# Patient Record
Sex: MALE | Race: WHITE | NOT HISPANIC OR LATINO | URBAN - METROPOLITAN AREA
[De-identification: names, ages, dates, MRNs, and addresses within clinical notes are randomized per-mention and may not be internally consistent; named-entity substitution may affect disease eponyms.]

---

## 2023-08-28 PROBLEM — R10.13 DYSPEPSIA: Status: ACTIVE | Noted: 2023-08-28

## 2023-08-28 PROBLEM — F90.9 ATTENTION DEFICIT HYPERACTIVITY DISORDER (ADHD): Status: ACTIVE | Noted: 2023-08-28

## 2023-08-28 PROBLEM — G47.33 OBSTRUCTIVE SLEEP APNEA: Status: ACTIVE | Noted: 2023-08-28

## 2023-08-28 PROBLEM — I86.1 VARICOCELE: Status: ACTIVE | Noted: 2023-08-28

## 2023-08-28 PROBLEM — I10 ELEVATED BLOOD PRESSURE READING IN OFFICE WITH DIAGNOSIS OF HYPERTENSION: Status: ACTIVE | Noted: 2023-08-28

## 2023-08-28 PROBLEM — E88.810 DYSMETABOLIC SYNDROME: Status: ACTIVE | Noted: 2023-08-28

## 2023-08-28 PROBLEM — E78.5 HYPERLIPIDEMIA: Status: ACTIVE | Noted: 2023-08-28

## 2023-08-28 RX ORDER — OXYCODONE AND ACETAMINOPHEN 5; 325 MG/1; MG/1
1 TABLET ORAL EVERY 4 HOURS PRN
COMMUNITY

## 2023-08-28 RX ORDER — GLUCOSAM/CHONDRO/HERB 149/HYAL 750-100 MG
1 TABLET ORAL DAILY
COMMUNITY

## 2023-08-28 RX ORDER — BACLOFEN 10 MG/1
1 TABLET ORAL 3 TIMES DAILY PRN
COMMUNITY

## 2023-08-28 RX ORDER — NAPROXEN SODIUM 220 MG/1
1 TABLET ORAL DAILY
COMMUNITY

## 2023-08-28 RX ORDER — BUPROPION HYDROCHLORIDE 300 MG/1
1 TABLET ORAL EVERY MORNING
COMMUNITY

## 2023-08-28 RX ORDER — DOXYCYCLINE HYCLATE 100 MG
1 TABLET ORAL 2 TIMES DAILY
COMMUNITY

## 2023-08-28 RX ORDER — DEXTROAMPHETAMINE SACCHARATE, AMPHETAMINE ASPARTATE MONOHYDRATE, DEXTROAMPHETAMINE SULFATE AND AMPHETAMINE SULFATE 7.5; 7.5; 7.5; 7.5 MG/1; MG/1; MG/1; MG/1
1 CAPSULE, EXTENDED RELEASE ORAL EVERY MORNING
COMMUNITY
Start: 2019-04-16

## 2023-10-04 ENCOUNTER — TELEPHONE (OUTPATIENT)
Dept: PRIMARY CARE | Facility: CLINIC | Age: 48
End: 2023-10-04
Payer: COMMERCIAL

## 2023-10-04 DIAGNOSIS — Z12.5 SCREENING FOR PROSTATE CANCER: ICD-10-CM

## 2023-10-04 DIAGNOSIS — Z13.6 ENCOUNTER FOR SCREENING FOR CARDIOVASCULAR DISORDERS: Primary | ICD-10-CM

## 2023-10-04 DIAGNOSIS — F41.9 ANXIETY: ICD-10-CM

## 2023-10-04 DIAGNOSIS — R53.82 CHRONIC FATIGUE: ICD-10-CM

## 2023-10-04 DIAGNOSIS — E78.2 MIXED HYPERLIPIDEMIA: ICD-10-CM

## 2023-10-04 DIAGNOSIS — E03.9 HYPOTHYROIDISM, UNSPECIFIED TYPE: ICD-10-CM

## 2023-10-04 DIAGNOSIS — E88.810 DYSMETABOLIC SYNDROME: ICD-10-CM

## 2023-10-04 NOTE — TELEPHONE ENCOUNTER
Patient called office to request lab orders for routine blood work and order for CT calcium scoring.

## 2023-11-03 ENCOUNTER — LAB (OUTPATIENT)
Dept: LAB | Facility: LAB | Age: 48
End: 2023-11-03
Payer: COMMERCIAL

## 2023-11-03 DIAGNOSIS — E78.2 MIXED HYPERLIPIDEMIA: ICD-10-CM

## 2023-11-03 DIAGNOSIS — Z12.5 SCREENING FOR PROSTATE CANCER: ICD-10-CM

## 2023-11-03 DIAGNOSIS — E03.9 HYPOTHYROIDISM, UNSPECIFIED TYPE: ICD-10-CM

## 2023-11-03 DIAGNOSIS — R53.82 CHRONIC FATIGUE: ICD-10-CM

## 2023-11-03 DIAGNOSIS — E88.810 DYSMETABOLIC SYNDROME: ICD-10-CM

## 2023-11-03 LAB
25(OH)D3 SERPL-MCNC: 40 NG/ML (ref 30–100)
ALBUMIN SERPL BCP-MCNC: 4.1 G/DL (ref 3.4–5)
ALP SERPL-CCNC: 35 U/L (ref 33–120)
ALT SERPL W P-5'-P-CCNC: 32 U/L (ref 10–52)
ANION GAP SERPL CALC-SCNC: 12 MMOL/L (ref 10–20)
AST SERPL W P-5'-P-CCNC: 30 U/L (ref 9–39)
BASOPHILS # BLD AUTO: 0.05 X10*3/UL (ref 0–0.1)
BASOPHILS NFR BLD AUTO: 1.1 %
BILIRUB SERPL-MCNC: 0.5 MG/DL (ref 0–1.2)
BUN SERPL-MCNC: 10 MG/DL (ref 6–23)
CALCIUM SERPL-MCNC: 9.5 MG/DL (ref 8.6–10.6)
CHLORIDE SERPL-SCNC: 106 MMOL/L (ref 98–107)
CHOLEST SERPL-MCNC: 184 MG/DL (ref 0–199)
CHOLESTEROL/HDL RATIO: 4.1
CO2 SERPL-SCNC: 29 MMOL/L (ref 21–32)
CREAT SERPL-MCNC: 1.19 MG/DL (ref 0.5–1.3)
EOSINOPHIL # BLD AUTO: 0.27 X10*3/UL (ref 0–0.7)
EOSINOPHIL NFR BLD AUTO: 5.8 %
ERYTHROCYTE [DISTWIDTH] IN BLOOD BY AUTOMATED COUNT: 14.8 % (ref 11.5–14.5)
EST. AVERAGE GLUCOSE BLD GHB EST-MCNC: 108 MG/DL
FERRITIN SERPL-MCNC: 81 NG/ML (ref 20–300)
FOLATE SERPL-MCNC: 10.9 NG/ML
GFR SERPL CREATININE-BSD FRML MDRD: 75 ML/MIN/1.73M*2
GLUCOSE SERPL-MCNC: 89 MG/DL (ref 74–99)
HBA1C MFR BLD: 5.4 %
HCT VFR BLD AUTO: 47.2 % (ref 41–52)
HDLC SERPL-MCNC: 45.4 MG/DL
HGB BLD-MCNC: 15.4 G/DL (ref 13.5–17.5)
IMM GRANULOCYTES # BLD AUTO: 0.02 X10*3/UL (ref 0–0.7)
IMM GRANULOCYTES NFR BLD AUTO: 0.4 % (ref 0–0.9)
IRON SATN MFR SERPL: 19 % (ref 25–45)
IRON SERPL-MCNC: 65 UG/DL (ref 35–150)
LDLC SERPL CALC-MCNC: 130 MG/DL
LYMPHOCYTES # BLD AUTO: 1.4 X10*3/UL (ref 1.2–4.8)
LYMPHOCYTES NFR BLD AUTO: 30.2 %
MCH RBC QN AUTO: 28.6 PG (ref 26–34)
MCHC RBC AUTO-ENTMCNC: 32.6 G/DL (ref 32–36)
MCV RBC AUTO: 88 FL (ref 80–100)
MONOCYTES # BLD AUTO: 0.37 X10*3/UL (ref 0.1–1)
MONOCYTES NFR BLD AUTO: 8 %
NEUTROPHILS # BLD AUTO: 2.52 X10*3/UL (ref 1.2–7.7)
NEUTROPHILS NFR BLD AUTO: 54.5 %
NON HDL CHOLESTEROL: 139 MG/DL (ref 0–149)
NRBC BLD-RTO: 0 /100 WBCS (ref 0–0)
PLATELET # BLD AUTO: 307 X10*3/UL (ref 150–450)
POTASSIUM SERPL-SCNC: 5.2 MMOL/L (ref 3.5–5.3)
PROT SERPL-MCNC: 6.3 G/DL (ref 6.4–8.2)
PSA SERPL-MCNC: 0.86 NG/ML
RBC # BLD AUTO: 5.39 X10*6/UL (ref 4.5–5.9)
SODIUM SERPL-SCNC: 142 MMOL/L (ref 136–145)
TIBC SERPL-MCNC: 345 UG/DL (ref 240–445)
TRIGL SERPL-MCNC: 43 MG/DL (ref 0–149)
TSH SERPL-ACNC: 2.32 MIU/L (ref 0.44–3.98)
UIBC SERPL-MCNC: 280 UG/DL (ref 110–370)
VIT B12 SERPL-MCNC: 398 PG/ML (ref 211–911)
VLDL: 9 MG/DL (ref 0–40)
WBC # BLD AUTO: 4.6 X10*3/UL (ref 4.4–11.3)

## 2023-11-03 PROCEDURE — 83036 HEMOGLOBIN GLYCOSYLATED A1C: CPT

## 2023-11-03 PROCEDURE — 82306 VITAMIN D 25 HYDROXY: CPT

## 2023-11-03 PROCEDURE — 84153 ASSAY OF PSA TOTAL: CPT

## 2023-11-03 PROCEDURE — 80061 LIPID PANEL: CPT

## 2023-11-03 PROCEDURE — 36415 COLL VENOUS BLD VENIPUNCTURE: CPT

## 2023-11-03 PROCEDURE — 82728 ASSAY OF FERRITIN: CPT

## 2023-11-03 PROCEDURE — 83540 ASSAY OF IRON: CPT

## 2023-11-03 PROCEDURE — 83550 IRON BINDING TEST: CPT

## 2023-11-03 PROCEDURE — 82607 VITAMIN B-12: CPT

## 2023-11-03 PROCEDURE — 84443 ASSAY THYROID STIM HORMONE: CPT

## 2023-11-03 PROCEDURE — 85025 COMPLETE CBC W/AUTO DIFF WBC: CPT

## 2023-11-03 PROCEDURE — 82746 ASSAY OF FOLIC ACID SERUM: CPT

## 2023-11-03 PROCEDURE — 80053 COMPREHEN METABOLIC PANEL: CPT

## 2024-07-30 ENCOUNTER — LAB (OUTPATIENT)
Dept: LAB | Facility: LAB | Age: 49
End: 2024-07-30
Payer: COMMERCIAL

## 2024-07-30 DIAGNOSIS — F33.2 MAJOR DEPRESSIVE DISORDER, RECURRENT SEVERE WITHOUT PSYCHOTIC FEATURES (MULTI): ICD-10-CM

## 2024-07-30 DIAGNOSIS — R53.82 CHRONIC FATIGUE, UNSPECIFIED: Primary | ICD-10-CM

## 2024-07-30 DIAGNOSIS — R37 SEXUAL DYSFUNCTION, UNSPECIFIED: ICD-10-CM

## 2024-07-30 LAB
25(OH)D3 SERPL-MCNC: 40 NG/ML (ref 30–100)
ALBUMIN SERPL BCP-MCNC: 4.4 G/DL (ref 3.4–5)
ALP SERPL-CCNC: 52 U/L (ref 33–120)
ALT SERPL W P-5'-P-CCNC: 24 U/L (ref 10–52)
ANION GAP SERPL CALC-SCNC: 12 MMOL/L (ref 10–20)
AST SERPL W P-5'-P-CCNC: 22 U/L (ref 9–39)
BASOPHILS # BLD AUTO: 0.06 X10*3/UL (ref 0–0.1)
BASOPHILS NFR BLD AUTO: 1.1 %
BILIRUB SERPL-MCNC: 1.1 MG/DL (ref 0–1.2)
BUN SERPL-MCNC: 12 MG/DL (ref 6–23)
CALCIUM SERPL-MCNC: 9.2 MG/DL (ref 8.6–10.6)
CHLORIDE SERPL-SCNC: 101 MMOL/L (ref 98–107)
CHOLEST SERPL-MCNC: 215 MG/DL (ref 0–199)
CHOLESTEROL/HDL RATIO: 3.4
CO2 SERPL-SCNC: 30 MMOL/L (ref 21–32)
CREAT SERPL-MCNC: 1.25 MG/DL (ref 0.5–1.3)
EGFRCR SERPLBLD CKD-EPI 2021: 70 ML/MIN/1.73M*2
EOSINOPHIL # BLD AUTO: 0.41 X10*3/UL (ref 0–0.7)
EOSINOPHIL NFR BLD AUTO: 7.6 %
ERYTHROCYTE [DISTWIDTH] IN BLOOD BY AUTOMATED COUNT: 14.3 % (ref 11.5–14.5)
EST. AVERAGE GLUCOSE BLD GHB EST-MCNC: 105 MG/DL
ESTRADIOL SERPL-MCNC: <19 PG/ML
FERRITIN SERPL-MCNC: 44 NG/ML (ref 20–300)
FOLATE SERPL-MCNC: 4.5 NG/ML
GLUCOSE SERPL-MCNC: 83 MG/DL (ref 74–99)
HBA1C MFR BLD: 5.3 %
HCT VFR BLD AUTO: 46.3 % (ref 41–52)
HDLC SERPL-MCNC: 63.8 MG/DL
HGB BLD-MCNC: 15.2 G/DL (ref 13.5–17.5)
IMM GRANULOCYTES # BLD AUTO: 0.01 X10*3/UL (ref 0–0.7)
IMM GRANULOCYTES NFR BLD AUTO: 0.2 % (ref 0–0.9)
IRON SATN MFR SERPL: ABNORMAL %
IRON SERPL-MCNC: 285 UG/DL (ref 35–150)
LDLC SERPL CALC-MCNC: 139 MG/DL
LYMPHOCYTES # BLD AUTO: 1.44 X10*3/UL (ref 1.2–4.8)
LYMPHOCYTES NFR BLD AUTO: 26.8 %
MCH RBC QN AUTO: 28.3 PG (ref 26–34)
MCHC RBC AUTO-ENTMCNC: 32.8 G/DL (ref 32–36)
MCV RBC AUTO: 86 FL (ref 80–100)
MONOCYTES # BLD AUTO: 0.48 X10*3/UL (ref 0.1–1)
MONOCYTES NFR BLD AUTO: 8.9 %
NEUTROPHILS # BLD AUTO: 2.98 X10*3/UL (ref 1.2–7.7)
NEUTROPHILS NFR BLD AUTO: 55.4 %
NON HDL CHOLESTEROL: 151 MG/DL (ref 0–149)
NRBC BLD-RTO: 0 /100 WBCS (ref 0–0)
PLATELET # BLD AUTO: 269 X10*3/UL (ref 150–450)
POTASSIUM SERPL-SCNC: 4.8 MMOL/L (ref 3.5–5.3)
PROT SERPL-MCNC: 5.9 G/DL (ref 6.4–8.2)
RBC # BLD AUTO: 5.38 X10*6/UL (ref 4.5–5.9)
SODIUM SERPL-SCNC: 138 MMOL/L (ref 136–145)
TIBC SERPL-MCNC: ABNORMAL UG/DL
TRIGL SERPL-MCNC: 63 MG/DL (ref 0–149)
TSH SERPL-ACNC: 3 MIU/L (ref 0.44–3.98)
UIBC SERPL-MCNC: <55 UG/DL (ref 110–370)
VIT B12 SERPL-MCNC: 400 PG/ML (ref 211–911)
VLDL: 13 MG/DL (ref 0–40)
WBC # BLD AUTO: 5.4 X10*3/UL (ref 4.4–11.3)

## 2024-07-30 PROCEDURE — 36415 COLL VENOUS BLD VENIPUNCTURE: CPT

## 2024-07-30 PROCEDURE — 80053 COMPREHEN METABOLIC PANEL: CPT

## 2024-07-30 PROCEDURE — 83550 IRON BINDING TEST: CPT

## 2024-07-30 PROCEDURE — 84270 ASSAY OF SEX HORMONE GLOBUL: CPT

## 2024-07-30 PROCEDURE — 82306 VITAMIN D 25 HYDROXY: CPT

## 2024-07-30 PROCEDURE — 83036 HEMOGLOBIN GLYCOSYLATED A1C: CPT

## 2024-07-30 PROCEDURE — 84402 ASSAY OF FREE TESTOSTERONE: CPT

## 2024-07-30 PROCEDURE — 82670 ASSAY OF TOTAL ESTRADIOL: CPT

## 2024-07-30 PROCEDURE — 83540 ASSAY OF IRON: CPT

## 2024-07-30 PROCEDURE — 82728 ASSAY OF FERRITIN: CPT

## 2024-07-30 PROCEDURE — 82607 VITAMIN B-12: CPT

## 2024-07-30 PROCEDURE — 85025 COMPLETE CBC W/AUTO DIFF WBC: CPT

## 2024-07-30 PROCEDURE — 84443 ASSAY THYROID STIM HORMONE: CPT

## 2024-07-30 PROCEDURE — 82746 ASSAY OF FOLIC ACID SERUM: CPT

## 2024-07-30 PROCEDURE — 80061 LIPID PANEL: CPT

## 2024-07-31 LAB — SHBG SERPL-SCNC: 25 NMOL/L (ref 19–76)

## 2024-08-05 LAB
TESTOSTERONE FREE (CHAN): 794.7 PG/ML (ref 35–155)
TESTOSTERONE,TOTAL,LC-MS/MS: 2539 NG/DL (ref 250–1100)

## 2025-03-18 ASSESSMENT — PROMIS GLOBAL HEALTH SCALE
CARRYOUT_PHYSICAL_ACTIVITIES: COMPLETELY
RATE_QUALITY_OF_LIFE: VERY GOOD
RATE_AVERAGE_PAIN: 2
RATE_AVERAGE_FATIGUE: SEVERE
CARRYOUT_SOCIAL_ACTIVITIES: EXCELLENT
RATE_MENTAL_HEALTH: GOOD
RATE_SOCIAL_SATISFACTION: VERY GOOD
EMOTIONAL_PROBLEMS: SOMETIMES
RATE_GENERAL_HEALTH: GOOD
RATE_PHYSICAL_HEALTH: GOOD

## 2025-03-19 ENCOUNTER — OFFICE VISIT (OUTPATIENT)
Dept: PRIMARY CARE | Facility: CLINIC | Age: 50
End: 2025-03-19
Payer: COMMERCIAL

## 2025-03-19 VITALS
OXYGEN SATURATION: 99 % | SYSTOLIC BLOOD PRESSURE: 120 MMHG | HEART RATE: 92 BPM | DIASTOLIC BLOOD PRESSURE: 98 MMHG | WEIGHT: 158 LBS | BODY MASS INDEX: 22.12 KG/M2 | TEMPERATURE: 97.6 F | HEIGHT: 71 IN

## 2025-03-19 DIAGNOSIS — F41.9 ANXIETY: ICD-10-CM

## 2025-03-19 DIAGNOSIS — L98.9 SKIN LESIONS: ICD-10-CM

## 2025-03-19 DIAGNOSIS — Z12.11 ENCOUNTER FOR SCREENING FOR MALIGNANT NEOPLASM OF COLON: ICD-10-CM

## 2025-03-19 DIAGNOSIS — Z12.5 SCREENING FOR PROSTATE CANCER: ICD-10-CM

## 2025-03-19 DIAGNOSIS — E78.2 MIXED HYPERLIPIDEMIA: ICD-10-CM

## 2025-03-19 DIAGNOSIS — Z00.00 ANNUAL PHYSICAL EXAM: Primary | ICD-10-CM

## 2025-03-19 LAB — PSA SERPL-MCNC: 0.81 NG/ML

## 2025-03-19 PROCEDURE — 3080F DIAST BP >= 90 MM HG: CPT | Performed by: INTERNAL MEDICINE

## 2025-03-19 PROCEDURE — 1036F TOBACCO NON-USER: CPT | Performed by: INTERNAL MEDICINE

## 2025-03-19 PROCEDURE — 99396 PREV VISIT EST AGE 40-64: CPT | Performed by: INTERNAL MEDICINE

## 2025-03-19 PROCEDURE — 3074F SYST BP LT 130 MM HG: CPT | Performed by: INTERNAL MEDICINE

## 2025-03-19 PROCEDURE — 3008F BODY MASS INDEX DOCD: CPT | Performed by: INTERNAL MEDICINE

## 2025-03-19 ASSESSMENT — PATIENT HEALTH QUESTIONNAIRE - PHQ9
2. FEELING DOWN, DEPRESSED OR HOPELESS: NOT AT ALL
SUM OF ALL RESPONSES TO PHQ9 QUESTIONS 1 AND 2: 0
1. LITTLE INTEREST OR PLEASURE IN DOING THINGS: NOT AT ALL

## 2025-03-19 ASSESSMENT — ENCOUNTER SYMPTOMS
ENDOCRINE NEGATIVE: 1
CONSTITUTIONAL NEGATIVE: 1
HEMATOLOGIC/LYMPHATIC NEGATIVE: 1
PSYCHIATRIC NEGATIVE: 1
NEUROLOGICAL NEGATIVE: 1
CARDIOVASCULAR NEGATIVE: 1
EYES NEGATIVE: 1
MUSCULOSKELETAL NEGATIVE: 1
GASTROINTESTINAL NEGATIVE: 1
RESPIRATORY NEGATIVE: 1
ALLERGIC/IMMUNOLOGIC NEGATIVE: 1

## 2025-03-19 ASSESSMENT — PAIN SCALES - GENERAL: PAINLEVEL_OUTOF10: 0-NO PAIN

## 2025-03-19 NOTE — PROGRESS NOTES
[facility]: [facility]  PHYSICAL EXAM      Buster Dao is a 49 y.o. male that is presenting today for Annual Exam.    Assessment/Plan   Diagnoses and all orders for this visit:  Annual physical exam  Mixed hyperlipidemia  -     CT cardiac scoring wo IV contrast; Future  Anxiety  Screening for prostate cancer  -     Prostate Specific Antigen; Future  Encounter for screening for malignant neoplasm of colon  -     Cologuard® colon cancer screening; Future  Skin lesions  -     Referral to Dermatology  Other orders  -     Follow Up In Primary Care - Health Maintenance; Future  We finished a wellness exam today and have the following assessments:  1.  Cardiovascular health-he will forward me his lipid panel.  He is staying very physically active he is interested in doing a CT heart calcium score and this will be arranged  2.  Cancer screenings-for colon cancer he has been doing Cologuard with his last one about 3 years ago this is ordered for him.  We also ordered a PSA for prostate cancer screening.  3.  Immunizations-he could benefit from Shingrix and I have recommended this to him.  He last had Tdap in 2019 and this is not yet due.  4.  History of elevated cholesterol-we we will await the repeat numbers that he will have forwarded to me.  5.  History of anxiety-as per his psychiatrist  6.  History of hypogonadism he is on testosterone replacement and is being monitored for such.    I will plan on seeing him in a year unless he needs me    Also sees Carolyn Frye (psych).  Subjective   This 49-year-old is here for annual wellness visit.  I have actually have not seen Alex for a few years but on the good side his health really has been great.  He is seeing a team as a relates to some metabolic issues with methylation is anticipating that he is going to have a big lab panel soon which will he will be forwarding to me.  Otherwise he really feels about normal baseline self.  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      Review of Systems   Constitutional: Negative.    HENT: Negative.     Eyes: Negative.    Respiratory: Negative.     Cardiovascular: Negative.    Gastrointestinal: Negative.    Endocrine: Negative.    Genitourinary: Negative.    Musculoskeletal: Negative.    Skin: Negative.    Allergic/Immunologic: Negative.    Neurological: Negative.    Hematological: Negative.    Psychiatric/Behavioral: Negative.     All other systems reviewed and are negative.     Objective   Vitals:    03/19/25 0856   BP: (!) 120/98   Pulse: 92   Temp: 36.4 °C (97.6 °F)   SpO2: 99%     Body mass index is 22.04 kg/m².  Physical Exam  Vitals and nursing note reviewed.   Constitutional:       General: He is not in acute distress.     Appearance: Normal appearance. He is not ill-appearing.   HENT:      Head: Normocephalic and atraumatic.      Right Ear: Tympanic membrane, ear canal and external ear normal. There is no impacted cerumen.      Left Ear: Tympanic membrane, ear canal and external ear normal. There is no impacted cerumen.      Nose: Nose normal.      Mouth/Throat:      Mouth: Mucous membranes are moist.      Pharynx: Oropharynx is clear. No oropharyngeal exudate or posterior oropharyngeal erythema.   Eyes:      General: No scleral icterus.        Right eye: No discharge.         Left eye: No discharge.      Extraocular Movements: Extraocular movements intact.      Conjunctiva/sclera: Conjunctivae normal.      Pupils: Pupils are equal, round, and reactive to light.   Neck:      Vascular: No carotid bruit.   Cardiovascular:      Rate and Rhythm: Normal rate and regular rhythm.      Pulses: Normal pulses.      Heart sounds: Normal heart sounds. No murmur heard.     No friction rub. No gallop.   Pulmonary:      Effort: Pulmonary effort is normal. No respiratory distress.      Breath sounds: Normal breath sounds.   Abdominal:      General: Abdomen is flat. Bowel sounds are  "normal.      Palpations: Abdomen is soft.      Tenderness: There is no abdominal tenderness.      Hernia: No hernia is present.   Musculoskeletal:         General: No swelling. Normal range of motion.      Cervical back: Normal range of motion.   Lymphadenopathy:      Cervical: No cervical adenopathy.   Skin:     General: Skin is warm and dry.      Coloration: Skin is not jaundiced.      Findings: No rash.   Neurological:      General: No focal deficit present.      Mental Status: He is alert and oriented to person, place, and time. Mental status is at baseline.   Psychiatric:         Mood and Affect: Mood normal.         Behavior: Behavior normal.       Diagnostic Results   Lab Results   Component Value Date    GLUCOSE 83 07/30/2024    CALCIUM 9.2 07/30/2024     07/30/2024    K 4.8 07/30/2024    CO2 30 07/30/2024     07/30/2024    BUN 12 07/30/2024    CREATININE 1.25 07/30/2024     Lab Results   Component Value Date    ALT 24 07/30/2024    AST 22 07/30/2024    ALKPHOS 52 07/30/2024    BILITOT 1.1 07/30/2024     Lab Results   Component Value Date    WBC 5.4 07/30/2024    HGB 15.2 07/30/2024    HCT 46.3 07/30/2024    MCV 86 07/30/2024     07/30/2024     Lab Results   Component Value Date    CHOL 215 (H) 07/30/2024    CHOL 184 11/03/2023    CHOL 208 (H) 01/06/2021     Lab Results   Component Value Date    HDL 63.8 07/30/2024    HDL 45.4 11/03/2023    HDL 51 01/06/2021     Lab Results   Component Value Date    LDLCALC 139 (H) 07/30/2024    LDLCALC 130 (H) 11/03/2023    LDLCALC 148 (H) 01/06/2021     Lab Results   Component Value Date    TRIG 63 07/30/2024    TRIG 43 11/03/2023    TRIG 45 01/06/2021     No components found for: \"CHOLHDL\"  Lab Results   Component Value Date    HGBA1C 5.3 07/30/2024     Other labs not included in the list above were reviewed either before or during this encounter.    History   Past Medical History:   Diagnosis Date    Acute upper respiratory infection, unspecified " 01/28/2016    Acute URI    Opioid dependence, uncomplicated (Multi) 01/26/2017    Moderate opioid dependence    Other specified health status     No pertinent past medical history    Personal history of malignant neoplasm, unspecified     History of malignant neoplasm    Personal history of other diseases of the nervous system and sense organs     History of sleep apnea    Personal history of other diseases of the respiratory system 04/28/2017    History of acute sinusitis    Personal history of other specified conditions     History of snoring    Personal history of urinary calculi 09/29/2016    History of kidney stones     Past Surgical History:   Procedure Laterality Date    OTHER SURGICAL HISTORY  09/03/2015    Post Spinal Diskect Osteophytect Lumb Interspace Microdiscec    TONSILLECTOMY  09/03/2015    Tonsillectomy     Family History   Problem Relation Name Age of Onset    No Known Problems Mother      No Known Problems Father      No Known Problems Brother      No Known Problems Daughter      Heart disease Other          father's side     Social History     Socioeconomic History    Marital status:      Spouse name: Not on file    Number of children: Not on file    Years of education: Not on file    Highest education level: Not on file   Occupational History    Not on file   Tobacco Use    Smoking status: Never    Smokeless tobacco: Never   Vaping Use    Vaping status: Never Used   Substance and Sexual Activity    Alcohol use: Yes     Comment: one drink every six months    Drug use: Not Currently    Sexual activity: Not on file   Other Topics Concern    Not on file   Social History Narrative    Not on file     Social Drivers of Health     Financial Resource Strain: Not on file   Food Insecurity: Not on file   Transportation Needs: Not on file   Physical Activity: Not on file   Stress: Not on file   Social Connections: Not on file   Intimate Partner Violence: Not on file   Housing Stability: Not on file      Allergies   Allergen Reactions    Amitriptyline Unknown     Current Outpatient Medications on File Prior to Visit   Medication Sig Dispense Refill    amphetamine-dextroamphetamine XR (Adderall XR) 30 mg 24 hr capsule Take 1 capsule (30 mg) by mouth once daily in the morning.      buPROPion XL (Wellbutrin XL) 300 mg 24 hr tablet Take 1 tablet (300 mg) by mouth once daily in the morning.      omega 3-dha-epa-fish oil (Fish OiL) 1,000 mg (120 mg-180 mg) capsule Take 1 capsule (1,000 mg) by mouth once daily.      [DISCONTINUED] baclofen (Lioresal) 10 mg tablet Take 1 tablet (10 mg) by mouth 3 times a day as needed for muscle spasms.      [DISCONTINUED] doxycycline (Vibra-Tabs) 100 mg tablet Take 1 tablet (100 mg) by mouth 2 times a day.      [DISCONTINUED] omega 3-dha-epa-fish oil (Fish OiL) 1,200 (144-216) mg capsule Take 1 capsule (1,200 mg) by mouth once daily.      [DISCONTINUED] oxyCODONE-acetaminophen (Percocet) 5-325 mg tablet Take 1 tablet by mouth every 4 hours if needed for moderate pain (4 - 6) or severe pain (7 - 10).       No current facility-administered medications on file prior to visit.     Immunization History   Administered Date(s) Administered    Flu vaccine (IIV4), preservative free *Check age/dose* 09/30/2020    Flu vaccine, quadrivalent, no egg protein, age 6 month or greater (FLUCELVAX) 09/11/2019    Hepatitis B vaccine, adult *Check Product/Dose* 09/11/2019, 02/14/2020, 08/07/2020    MMR vaccine, subcutaneous (MMR II) 10/29/2019    PPD Test 09/11/2019, 09/30/2020    Tdap vaccine, age 7 year and older (BOOSTRIX, ADACEL) 09/06/2019     Patient's medical history was reviewed and updated either before or during this encounter.       Uziel Carty MD

## 2025-03-19 NOTE — PATIENT INSTRUCTIONS
Was nice to see you for your wellness visit today.  Here is a few of the things that we discussed:  1.  Cardiovascular health lets go ahead and get that CT of the heart as we discussed for calcium scoring and risk assessment  2.  Cancer screenings-I ordered a Cologuard and a PSA as we discussed  3.  Immunizations you could benefit from shingles vaccines as you are now over age 50 at the 2 shot series you get that either here or at the pharmacy  4.  History of elevated cholesterol I will await the values that you plan to forward to me  5.  History of hypogonadism-continue your testosterone as per your other doctors make sure they are properly monitoring such.    I will plan on seeing you in a year unless you need me sooner

## 2025-06-04 ENCOUNTER — HOSPITAL ENCOUNTER (OUTPATIENT)
Dept: RADIOLOGY | Facility: CLINIC | Age: 50
End: 2025-06-04
Payer: COMMERCIAL

## 2025-06-09 ENCOUNTER — APPOINTMENT (OUTPATIENT)
Dept: RADIOLOGY | Facility: HOSPITAL | Age: 50
End: 2025-06-09
Payer: COMMERCIAL

## 2025-06-09 DIAGNOSIS — E78.2 MIXED HYPERLIPIDEMIA: ICD-10-CM

## 2025-06-09 PROCEDURE — 75571 CT HRT W/O DYE W/CA TEST: CPT
